# Patient Record
(demographics unavailable — no encounter records)

---

## 2024-10-25 NOTE — DISCUSSION/SUMMARY
[Medication Risks Reviewed] : Medication risks reviewed [de-identified] : reviewed the case and the imaging with the patient  neck/back with mild degnerative changes  discussion of the condition and treatment options cautions discussed questions answered discussion of natural history of the condition and what the next step would be PT  doesnt look like surgical  consider pain management  she is aware of the fibroids as well as noted - she can f/u with  EMIR

## 2024-10-25 NOTE — HISTORY OF PRESENT ILLNESS
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